# Patient Record
Sex: FEMALE | Race: WHITE | Employment: OTHER | URBAN - NONMETROPOLITAN AREA
[De-identification: names, ages, dates, MRNs, and addresses within clinical notes are randomized per-mention and may not be internally consistent; named-entity substitution may affect disease eponyms.]

---

## 2017-01-04 PROBLEM — J44.9 COPD (CHRONIC OBSTRUCTIVE PULMONARY DISEASE) (HCC): Status: ACTIVE | Noted: 2017-01-04

## 2022-09-23 ENCOUNTER — HOSPITAL ENCOUNTER (OUTPATIENT)
Age: 87
Setting detail: OUTPATIENT SURGERY
Discharge: HOME | End: 2022-09-23
Attending: SPECIALIST | Admitting: SPECIALIST
Payer: MEDICARE

## 2022-09-23 VITALS
DIASTOLIC BLOOD PRESSURE: 69 MMHG | TEMPERATURE: 98.2 F | WEIGHT: 97 LBS | BODY MASS INDEX: 17.19 KG/M2 | RESPIRATION RATE: 16 BRPM | HEART RATE: 57 BPM | SYSTOLIC BLOOD PRESSURE: 162 MMHG | OXYGEN SATURATION: 95 % | HEIGHT: 63 IN

## 2022-09-23 DIAGNOSIS — C44.622 SQUAMOUS CELL CARCINOMA OF RIGHT WRIST: ICD-10-CM

## 2022-09-23 PROCEDURE — 7100000011 HC PHASE II RECOVERY - ADDTL 15 MIN: Performed by: SPECIALIST

## 2022-09-23 PROCEDURE — 88331 PATH CONSLTJ SURG 1 BLK 1SPC: CPT

## 2022-09-23 PROCEDURE — 7100000010 HC PHASE II RECOVERY - FIRST 15 MIN: Performed by: SPECIALIST

## 2022-09-23 PROCEDURE — 3600000012 HC SURGERY LEVEL 2 ADDTL 15MIN: Performed by: SPECIALIST

## 2022-09-23 PROCEDURE — 88305 TISSUE EXAM BY PATHOLOGIST: CPT

## 2022-09-23 PROCEDURE — 3600000002 HC SURGERY LEVEL 2 BASE: Performed by: SPECIALIST

## 2022-09-23 PROCEDURE — 2709999900 HC NON-CHARGEABLE SUPPLY: Performed by: SPECIALIST

## 2022-09-23 PROCEDURE — 2500000003 HC RX 250 WO HCPCS: Performed by: SPECIALIST

## 2022-09-23 RX ORDER — PANTOPRAZOLE SODIUM 40 MG/1
40 TABLET, DELAYED RELEASE ORAL DAILY
COMMUNITY

## 2022-09-23 RX ORDER — MULTIVIT-MIN/IRON/FOLIC ACID/K 18-600-40
CAPSULE ORAL
COMMUNITY

## 2022-09-23 RX ORDER — LIDOCAINE HYDROCHLORIDE AND EPINEPHRINE BITARTRATE 20; .01 MG/ML; MG/ML
INJECTION, SOLUTION SUBCUTANEOUS PRN
Status: DISCONTINUED | OUTPATIENT
Start: 2022-09-23 | End: 2022-09-23 | Stop reason: ALTCHOICE

## 2022-09-23 RX ORDER — LANOLIN ALCOHOL/MO/W.PET/CERES
1000 CREAM (GRAM) TOPICAL DAILY
COMMUNITY

## 2022-09-23 RX ORDER — MEMANTINE HYDROCHLORIDE 5 MG/1
5 TABLET ORAL DAILY
COMMUNITY

## 2022-09-23 ASSESSMENT — PAIN - FUNCTIONAL ASSESSMENT: PAIN_FUNCTIONAL_ASSESSMENT: 0-10

## 2022-09-23 NOTE — PROGRESS NOTES
Pt. Admitted in stable condition. Consent signed. VS obtained and WNL. Pt. Denies all other needs. Warm blanket provided. Family brought to bedside. Call light left within reach.

## 2022-09-23 NOTE — DISCHARGE INSTRUCTIONS
POST OPERATIVE INSTRUCTION SHEET  SKIN TUMOR/LESION REMOVAL          Activity:    No strenuous activity for 48 hours  No activity that stresses the suture closure/incision  Regular diet; Unless operation of lip- then clear liquids for 48 hours (Sip from a cup: do not use a straw)  ABSOLUTELY NO NICOTINE OF ANY TYPE    Wound Care:  Leave dressing on arm in place. Do not remove. Do not get wet. Dermabond was used on your upper chest, do not scrub, rub or pick at adhesive glue    Limitations:  No swimming, hot tub, sauna or soaking in a bathtub    Prescriptions: Take exactly as prescribed    Follow-Up:  Call office for an appointment in 5-7 days, unless otherwise instructed by Dr. Campbell Sink our office if you experience any of the following:   Develop a fever (temperature is greater than 100.5F)   Develop redness greater than 1 cm around incision or red streaks up         extremity   Have any excess bleeding/ increased drainage or swelling at the             incision site    *Note:  Your pathology results will be reviewed with you at your scheduled follow-up appointment.

## 2022-09-23 NOTE — H&P
[address] [address]  History and Physical Update    Pt Name: Linh Wang  MRN: [id number]  YOB: 1934  Date of evaluation: 9/23/2022    I have examined the patient and reviewed the H&P/Consult and there are no changes to the patient or plans.       Ashley Leon MD  Electronically signed 9/23/2022 at 10:25 AM

## 2022-09-23 NOTE — PROGRESS NOTES
1120 To recovery via chair. Spont resp. VSS. Report received from surgical rn. Ace wrap dressing intact to right forearm. Skin glue intact to incision on upper right chest area. Denies pain or nausea. Daughter to room. Snack and drink given. Call bell in reach. 1155 Discharge instructions given to pt and daughter with each voicing understanding. Up to restroom with daughter and nurse assist  1200 Return to room.  To dress with daughter assist  1210 Discharge to home per wheelchair in stable condition with daughter

## 2022-09-23 NOTE — OP NOTE
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    Post-operative Diagnosis: Same    Procedure Performed: 2.5cm excision of right medial dorsal wrist squamous cell carcinoma creating a 5cm2 defect that was repaired with a full thickness skin graft (5 cm2) (CPT 16447 & 18999)    Surgeons/Assistants: MD Christiano Narayan PA-C/Rigoberto Solorzano DPM    Estimated Blood Loss: 5ml     Complications: none immediately appreciated    Procedure: With the patient lying in the supine position and under adequate local anesthesia consisting of 11 ml of 1% Lidocaine 1:100,000 with epinephrine solution of the donor site of the right infraclavicular area as well as the right wrist squamous cell carcinoma. The carcinoma was excised at 2.5cm diameter and sent for fresh frozen evaluation after the area was prepped and draped in the standard surgical fashion. Pathology called back to the room and stated the margin were free. The patient has very thin skin and a large defect which could not be closed primarily, therefore a full thickness skin graft was taken. It was defatted and secured in position with a 3-0 silk suture tie and then a 5-0 fast absorbable suture was placed in a simple running fashion. A Bacitracin Xeroform and moist cotton ball tie over bolster was secured using the tails of the silk sutures. The donor site was closed with a 3-0 Monocryl suture placed in interrupted buried fashion and then Histoacryl respectively. The patient tolerated the procedure well and remained hemodynamically stable throughout the procedure and was quite comfortable throughout the operative course.     Clinical staging for cancer cases:  Shabbir Bird MD  Electronically signed by me on

## 2023-01-13 ENCOUNTER — HOSPITAL ENCOUNTER (OUTPATIENT)
Age: 88
Setting detail: OUTPATIENT SURGERY
Discharge: HOME | End: 2023-01-13
Attending: SPECIALIST | Admitting: SPECIALIST
Payer: MEDICARE

## 2023-01-13 VITALS
WEIGHT: 101 LBS | OXYGEN SATURATION: 100 % | HEART RATE: 61 BPM | RESPIRATION RATE: 14 BRPM | HEIGHT: 64 IN | BODY MASS INDEX: 17.24 KG/M2 | SYSTOLIC BLOOD PRESSURE: 122 MMHG | TEMPERATURE: 97.6 F | DIASTOLIC BLOOD PRESSURE: 58 MMHG

## 2023-01-13 DIAGNOSIS — L98.9 ARM LESION: ICD-10-CM

## 2023-01-13 PROCEDURE — 7100000010 HC PHASE II RECOVERY - FIRST 15 MIN: Performed by: SPECIALIST

## 2023-01-13 PROCEDURE — 88331 PATH CONSLTJ SURG 1 BLK 1SPC: CPT

## 2023-01-13 PROCEDURE — 2709999900 HC NON-CHARGEABLE SUPPLY: Performed by: SPECIALIST

## 2023-01-13 PROCEDURE — 88305 TISSUE EXAM BY PATHOLOGIST: CPT

## 2023-01-13 PROCEDURE — 3600000002 HC SURGERY LEVEL 2 BASE: Performed by: SPECIALIST

## 2023-01-13 PROCEDURE — 3600000012 HC SURGERY LEVEL 2 ADDTL 15MIN: Performed by: SPECIALIST

## 2023-01-13 PROCEDURE — 7100000011 HC PHASE II RECOVERY - ADDTL 15 MIN: Performed by: SPECIALIST

## 2023-01-13 PROCEDURE — 2500000003 HC RX 250 WO HCPCS: Performed by: SPECIALIST

## 2023-01-13 RX ORDER — LIDOCAINE HYDROCHLORIDE AND EPINEPHRINE BITARTRATE 20; .01 MG/ML; MG/ML
INJECTION, SOLUTION SUBCUTANEOUS PRN
Status: DISCONTINUED | OUTPATIENT
Start: 2023-01-13 | End: 2023-01-13 | Stop reason: ALTCHOICE

## 2023-01-13 ASSESSMENT — PAIN - FUNCTIONAL ASSESSMENT: PAIN_FUNCTIONAL_ASSESSMENT: NONE - DENIES PAIN

## 2023-01-13 NOTE — PROGRESS NOTES
Dressing  Right upper arm- skin glue, abd, kerlix, ace wrap  Right lower arm- bacitracin, xerform, moist cotton ball, 4x4, kerlix, ace wrap

## 2023-01-13 NOTE — DISCHARGE INSTRUCTIONS
POST OPERATIVE INSTRUCTION SHEET  SKIN TUMOR/LESION REMOVAL          Activity:    No strenuous activity for 48 hours  No activity that stresses the suture closure/incision  Regular diet; Unless operation of lip- then clear liquids for 48 hours (Sip from a cup: do not use a straw)  ABSOLUTELY NO NICOTINE OF ANY TYPE    Wound Care:  Leave dressing on arm in place. Do not remove. Do not get wet. Limitations:  No swimming, hot tub, sauna or soaking in a bathtub    Prescriptions: Take exactly as prescribed    Follow-Up:  Go to appointment Thursday January 19th at 11:30am.      Notify our office if you experience any of the following:   Develop a fever (temperature is greater than 100.5F)   Develop redness greater than 1 cm around incision or red streaks up extremity   Have any excess bleeding/ increased drainage or swelling at the incision site    *Note:  Your pathology results will be reviewed with you at your scheduled follow-up appointment.

## 2023-01-13 NOTE — PROGRESS NOTES
1303- Patient arrived to Phase II via chair, report from [facility]. Patient alert and awake, denies any needs or complaints. VSS. Drink and snack provided, call light in reach. 26- Discharge instructions reviewed with patient and daughter at bedside. All questions answered. Patient dressed. 1335- Patient discharged ambulatory in stable condition to private vehicle with daughter.

## 2023-01-13 NOTE — H&P
[facility]  History and Physical Update    Pt Name: Angus Salas  MRN: [id number]  YOB: 1934  Date of evaluation: 1/13/2023    I have examined the patient and reviewed the H&P/Consult and there are no changes to the patient or plans.       Chun Rudolph MD  Electronically signed 1/13/2023 at 11:47 AM

## 2023-01-13 NOTE — OP NOTE
Operative Note    Patient name: Lj Rodriguez             Medical Record Number: [id number]    Primary Care Physician: Sandy Duarte MD     1934    Date of Procedure: 2023    Pre-operative Diagnosis: 1.5cm nonhealing lesion of right dorsal forearm (suspected squamous cell carcinoma)     Post-operative Diagnosis: Same    Procedure Performed: 2.5cm excision creating a 4cm2 defect that was repaired with a full thickness skin graft (4 cm2) (CPT 03342 & 38916)    Surgeons/Assistants: MD Cathryn Kamara PA-C    Estimated Blood Loss: 5ml     Complications: none immediately appreciated    Procedure: With the patient lying in the supine position and under adequate local anesthesia consisting of 11 ml of 1% Lidocaine 1:100,000 with epinephrine solution of the donor site of the right medial arm as well as the suspected squamous cell carcinoma/non healing lesion of right dorsal forearm. The lesion was excised at 2.5cm diameter and sent for fresh frozen evaluation after the area was prepped and draped in the standard surgical fashion. Pathology called back to the room and stated the margin were free. The patient has very thin skin and a 4cm2 defect which could not be closed primarily, therefore a full thickness skin graft was taken. It was defatted and secured in position with a 3-0 silk suture tie and then a 5-0 fast absorbable suture was placed in a simple running fashion. A Bacitracin Xeroform and moist cotton ball tie over bolster was secured using the tails of the silk sutures. The donor site was closed with a 3-0 Monocryl suture placed in interrupted buried fashion and then Histoacryl respectively. The patient tolerated the procedure well and remained hemodynamically stable throughout the procedure and was quite comfortable throughout the operative course.     Clinical staging for cancer cases:  Katelyn Wahl MD  Electronically signed by me on 2023 at 1:11 PM Operative Note      Patient: Lashanda Wise  YOB: 1934  MRN: [id number]    Date of Procedure: 1/13/2023    Pre-Op Diagnosis: Arm lesion [L98.9]    Post-Op Diagnosis: Same       Procedure(s):  Excision Lesion Right Dorsal Foream with full thickness skin graft and frozen section    Surgeon(s):  Ruben Varner MD    Assistant:   Physician Assistant: Magali Schilling PA-C    Anesthesia: Local    Estimated Blood Loss (mL): Minimal    Complications: None    Specimens:   ID Type Source Tests Collected by Time Destination   A : lesion, right  dorsal forearm, long suture is lateral, short is superior - FS Tissue Arm SURGICAL PATHOLOGY Ruben Varner MD 1/13/2023 1204        Implants:  * No implants in log *      Drains: * No LDAs found *    Findings: 1.5cm nonhealing lesion of right dorsal forearm (suspected squamous cell carcinoma)       Detailed Description of Procedure:   2.5cm excision creating a 4cm2 defect that was repaired with a full thickness skin graft (4 cm2) (CPT 90386 & 78878)    Electronically signed by Ruben Varner MD on 1/13/2023 at 1:11 PM

## 2023-11-25 ENCOUNTER — INPATIENT HOSPITAL (OUTPATIENT)
Dept: URBAN - METROPOLITAN AREA HOSPITAL 138 | Facility: HOSPITAL | Age: 88
End: 2023-11-25

## 2023-11-25 DIAGNOSIS — K85.10 BILIARY ACUTE PANCREATITIS WITHOUT NECROSIS OR INFECTION: ICD-10-CM

## 2023-11-25 DIAGNOSIS — E87.6 HYPOKALEMIA: ICD-10-CM

## 2023-11-25 PROCEDURE — 99222 1ST HOSP IP/OBS MODERATE 55: CPT | Mod: 25,FS | Performed by: PHYSICIAN ASSISTANT

## 2023-11-26 PROCEDURE — 99232 SBSQ HOSP IP/OBS MODERATE 35: CPT | Mod: FS | Performed by: PHYSICIAN ASSISTANT

## 2023-11-27 ENCOUNTER — INPATIENT HOSPITAL (OUTPATIENT)
Dept: URBAN - METROPOLITAN AREA HOSPITAL 138 | Facility: HOSPITAL | Age: 88
End: 2023-11-27

## 2023-11-27 DIAGNOSIS — K85.10 BILIARY ACUTE PANCREATITIS WITHOUT NECROSIS OR INFECTION: ICD-10-CM

## 2023-11-27 DIAGNOSIS — K80.50 CALCULUS OF BILE DUCT WITHOUT CHOLANGITIS OR CHOLECYSTITIS W: ICD-10-CM

## 2023-11-27 PROCEDURE — 99232 SBSQ HOSP IP/OBS MODERATE 35: CPT | Mod: FS | Performed by: PHYSICIAN ASSISTANT

## 2023-11-28 ENCOUNTER — INPATIENT HOSPITAL (OUTPATIENT)
Dept: URBAN - METROPOLITAN AREA HOSPITAL 138 | Facility: HOSPITAL | Age: 88
End: 2023-11-28

## 2023-11-28 DIAGNOSIS — K80.50 CALCULUS OF BILE DUCT WITHOUT CHOLANGITIS OR CHOLECYSTITIS W: ICD-10-CM

## 2023-11-28 DIAGNOSIS — K22.2 ESOPHAGEAL OBSTRUCTION: ICD-10-CM

## 2023-11-28 PROCEDURE — 43235 EGD DIAGNOSTIC BRUSH WASH: CPT | Performed by: INTERNAL MEDICINE

## 2023-11-29 PROCEDURE — 99232 SBSQ HOSP IP/OBS MODERATE 35: CPT | Mod: FS | Performed by: PHYSICIAN ASSISTANT

## (undated) DEVICE — GLOVE ORANGE PI 7   MSG9070

## (undated) DEVICE — BANDAGE,GAUZE,4.5"X4.1YD,STERILE,LF: Brand: MEDLINE

## (undated) DEVICE — SHEETS DRAW

## (undated) DEVICE — SUTURE MCRYL SZ 4-0 L18IN ABSRB UD P-3 L13MM 3/8 CIR PRIM Y494G

## (undated) DEVICE — BANDAGE COMPR M W4INXL10YD WHT BGE VELC E MTRX HK AND LOOP

## (undated) DEVICE — STRIP,CLOSURE,WOUND,MEDI-STRIP,1/2X4: Brand: MEDLINE

## (undated) DEVICE — NEEDLE HYPO 27GA L1.25IN GRY POLYPR HUB S STL REG BVL STR

## (undated) DEVICE — GLOVE SURG SZ 8 L11.77IN FNGR THK9.8MIL STRW LTX POLYMER

## (undated) DEVICE — GARMENT,MEDLINE,DVT,INT,CALF,MED, GEN2: Brand: MEDLINE

## (undated) DEVICE — 3M™ STERI-STRIP™ COMPOUND BENZOIN TINCTURE 40 BAGS/CARTON 4 CARTONS/CASE C1544: Brand: 3M™ STERI-STRIP™

## (undated) DEVICE — COTTON BALL ST

## (undated) DEVICE — PACK PROCEDURE SURG PLAS SC MIN SRHP LF

## (undated) DEVICE — SUTURE NONABSORBABLE BRAIDED 4-0 SH 30 IN BLK PERMA HND K831H

## (undated) DEVICE — CHLORAPREP 26ML CLEAR

## (undated) DEVICE — GOWN,SIRUS,NON REINFRCD,LARGE,SET IN SL: Brand: MEDLINE